# Patient Record
Sex: FEMALE | Race: WHITE | NOT HISPANIC OR LATINO | Employment: FULL TIME | URBAN - METROPOLITAN AREA
[De-identification: names, ages, dates, MRNs, and addresses within clinical notes are randomized per-mention and may not be internally consistent; named-entity substitution may affect disease eponyms.]

---

## 2024-04-15 ENCOUNTER — OFFICE VISIT (OUTPATIENT)
Dept: URGENT CARE | Facility: PHYSICIAN GROUP | Age: 34
End: 2024-04-15
Payer: COMMERCIAL

## 2024-04-15 VITALS
SYSTOLIC BLOOD PRESSURE: 124 MMHG | HEART RATE: 90 BPM | TEMPERATURE: 97.6 F | RESPIRATION RATE: 16 BRPM | OXYGEN SATURATION: 97 % | WEIGHT: 224 LBS | DIASTOLIC BLOOD PRESSURE: 74 MMHG | HEIGHT: 69 IN | BODY MASS INDEX: 33.18 KG/M2

## 2024-04-15 DIAGNOSIS — Z86.19 HISTORY OF CANDIDAL VULVOVAGINITIS: ICD-10-CM

## 2024-04-15 DIAGNOSIS — J02.0 STREP PHARYNGITIS: Primary | ICD-10-CM

## 2024-04-15 LAB
FLUAV RNA SPEC QL NAA+PROBE: NEGATIVE
FLUBV RNA SPEC QL NAA+PROBE: NEGATIVE
RSV RNA SPEC QL NAA+PROBE: NEGATIVE
S PYO DNA SPEC NAA+PROBE: DETECTED
SARS-COV-2 RNA RESP QL NAA+PROBE: NEGATIVE

## 2024-04-15 PROCEDURE — 3074F SYST BP LT 130 MM HG: CPT

## 2024-04-15 PROCEDURE — 3078F DIAST BP <80 MM HG: CPT

## 2024-04-15 PROCEDURE — 87651 STREP A DNA AMP PROBE: CPT

## 2024-04-15 PROCEDURE — 99203 OFFICE O/P NEW LOW 30 MIN: CPT

## 2024-04-15 PROCEDURE — 0241U POCT CEPHEID COV-2, FLU A/B, RSV - PCR: CPT

## 2024-04-15 RX ORDER — FLUCONAZOLE 150 MG/1
TABLET ORAL
Qty: 2 TABLET | Refills: 0 | Status: SHIPPED | OUTPATIENT
Start: 2024-04-15

## 2024-04-15 RX ORDER — AMOXICILLIN AND CLAVULANATE POTASSIUM 875; 125 MG/1; MG/1
1 TABLET, FILM COATED ORAL 2 TIMES DAILY
Qty: 20 TABLET | Refills: 0 | Status: SHIPPED | OUTPATIENT
Start: 2024-04-15 | End: 2024-04-25

## 2024-04-15 RX ORDER — DEXAMETHASONE SODIUM PHOSPHATE 10 MG/ML
10 INJECTION INTRAMUSCULAR; INTRAVENOUS ONCE
Status: COMPLETED | OUTPATIENT
Start: 2024-04-15 | End: 2024-04-15

## 2024-04-15 RX ADMIN — DEXAMETHASONE SODIUM PHOSPHATE 10 MG: 10 INJECTION INTRAMUSCULAR; INTRAVENOUS at 09:07

## 2024-04-15 ASSESSMENT — ENCOUNTER SYMPTOMS
DIZZINESS: 0
VOMITING: 0
SPUTUM PRODUCTION: 0
STRIDOR: 0
WHEEZING: 0
COUGH: 0
EYE REDNESS: 0
CHILLS: 0
FEVER: 0
PALPITATIONS: 0
SHORTNESS OF BREATH: 0
NAUSEA: 0
ABDOMINAL PAIN: 0
HEADACHES: 0
DIARRHEA: 0
MYALGIAS: 0
SORE THROAT: 1
EYE DISCHARGE: 0
EYE PAIN: 0

## 2024-04-15 NOTE — PROGRESS NOTES
Subjective:   Delphine Elias is a 33 y.o. female who presents for Sore Throat (Pt states she had strep about 2 weeks ago and symptoms went away and started having a sore throat x 1 day)          I introduced myself to the patient and informed them that I am a family nurse practitioner.    HPI:Delphine a 33-year-old female who comes in today c/o pharyngitis. Onset was yesterday.  Patient states she was diagnosed with strep throat about 2 weeks ago [place] was [place], was treated with amoxicillin,, symptoms lasted several days and then she felt better.  She states that her symptoms started up again yesterday with severe sore throat.  Patient describes symptoms as constant. They describe the pain as burning, sharp and scratchy. Aggravating factors include eating, drinking, swallowing. Relieving factors include none. Treatments tried at home include  Advil with poor result . They describe their symptoms as moderate. Denies any known exposure to Covid, Flu, RSV, strep. Denies anyone else is sick at home presently.        Review of Systems   Constitutional:  Negative for chills, fever and malaise/fatigue.   HENT:  Positive for sore throat. Negative for congestion and ear pain.    Eyes:  Negative for pain, discharge and redness.   Respiratory:  Negative for cough, sputum production, shortness of breath, wheezing and stridor.    Cardiovascular:  Negative for chest pain and palpitations.   Gastrointestinal:  Negative for abdominal pain, diarrhea, nausea and vomiting.   Genitourinary:  Negative for dysuria.   Musculoskeletal:  Negative for myalgias.   Skin:  Negative for rash.   Neurological:  Negative for dizziness and headaches.       Medications: azithromycin Tabs  fluconazole     Allergies: Patient has no known allergies.    Problem List: does not have a problem list on file.    Surgical History:  No past surgical history on file.    Past Social Hx:   reports that she has never smoked. She has never used smokeless  "tobacco. She reports that she does not drink alcohol and does not use drugs.     Past Family Hx:   family history is not on file.     Problem list, medications, and allergies reviewed by myself today in Epic.   I have documented what I find to be significant in regards to past medical, social, family and surgical history  in my HPI or under PMH/PSH/FH review section, otherwise it is noncontributory     Objective:     /74 (BP Location: Right arm, Patient Position: Sitting, BP Cuff Size: Adult long)   Pulse 90   Temp 36.4 °C (97.6 °F) (Temporal)   Resp 16   Ht 1.753 m (5' 9\")   Wt 102 kg (224 lb)   SpO2 97%   BMI 33.08 kg/m²     During this visit, appropriate PPE was worn, and hand hygiene was performed.    Physical Exam  Vitals reviewed.   Constitutional:       General: She is not in acute distress.     Appearance: Normal appearance. She is not ill-appearing or toxic-appearing.   HENT:      Head: Normocephalic and atraumatic.      Right Ear: Tympanic membrane, ear canal and external ear normal. There is no impacted cerumen.      Left Ear: Tympanic membrane, ear canal and external ear normal. There is no impacted cerumen.      Nose: No congestion or rhinorrhea.      Mouth/Throat:      Pharynx: Oropharynx is clear. Posterior oropharyngeal erythema present. No oropharyngeal exudate.      Comments: Tonsillar swelling 2+ bilaterally with erythema, no exudates. There is posterior oropharyngeal erythema present.  No soft tissue swelling of the sublingual mucosa, no petechia or swelling of the soft or hard palate, no unilarteral oropharynx swelling, no sign of tonsillar stone, epiglottitis, or abscess.  Airway is patent and there is no stridor.  Patient is managing oral secretions appropriately.  Uvula is midline and appropriate size with no erythema or edema.    Eyes:      General: No scleral icterus.        Right eye: No discharge.         Left eye: No discharge.      Conjunctiva/sclera: Conjunctivae normal. " "     Pupils: Pupils are equal, round, and reactive to light.   Cardiovascular:      Rate and Rhythm: Normal rate and regular rhythm.      Heart sounds: Normal heart sounds. No murmur heard.     No friction rub. No gallop.   Pulmonary:      Effort: Pulmonary effort is normal. No respiratory distress.      Breath sounds: Normal breath sounds. No wheezing, rhonchi or rales.   Abdominal:      General: There is no distension.   Musculoskeletal:         General: Normal range of motion.      Cervical back: Normal range of motion. No rigidity.      Right lower leg: No edema.      Left lower leg: No edema.   Lymphadenopathy:      Cervical: No cervical adenopathy.   Skin:     General: Skin is warm and dry.      Coloration: Skin is not jaundiced.   Neurological:      General: No focal deficit present.      Mental Status: She is alert and oriented to person, place, and time. Mental status is at baseline.   Psychiatric:         Mood and Affect: Mood normal.         Behavior: Behavior normal.         Thought Content: Thought content normal.         Judgment: Judgment normal.         Lab Results/POC Test Results   Results for orders placed or performed in visit on 04/15/24   POCT CEPHEID GROUP A STREP - PCR   Result Value Ref Range    POC Group A Strep, PCR Detected (A) Not Detected, Invalid   POCT CoV-2, Flu A/B, RSV by PCR   Result Value Ref Range    SARS-CoV-2 by PCR Negative Negative, Invalid    Influenza virus A RNA Negative Negative, Invalid    Influenza virus B, PCR Negative Negative, Invalid    RSV, PCR Negative Negative, Invalid          Per UTD for recurrent strep - \"we repeat a full 10-day course of therapy. We usually select an antibiotic that has greater beta-lactamase stability than the one used for the initial treatment course. As examples, if penicillin was used for initial treatment, we use either amoxicillin-clavulanate or a first-generation cephalosporin; if a first-generation cephalosporin was used, we select a " "later-generation cephalosporin. The rationale for this strategy is based on clinical data that suggest relapse rates may be lower with cephalosporin use, as well as scientific observations that antibiotics with greater beta-lactamase activity may be more effective in eradicating GAS from the oropharynx. (See 'Antibiotic treatment' above.)\"    Assessment/Plan:     Diagnosis and associated orders:     1. Strep pharyngitis  POCT CEPHEID GROUP A STREP - PCR    POCT CoV-2, Flu A/B, RSV by PCR    dexamethasone (Decadron) injection (check route below) 10 mg    amoxicillin-clavulanate (AUGMENTIN) 875-125 MG Tab      2. History of candidal vulvovaginitis  fluconazole (DIFLUCAN) 150 MG tablet         Comments/MDM:     1. Strep pharyngitis  . Strep PCR is positive.  I did notify the patient and discuss strep precautions-   -no sharing of drinks of water bottles, change toothbrush and pillowcases after 48 hours of antibiotics  - generally after 48 hours of antibiotics no longer infectious, may return to work.   -May use Flonase nasal spray to help with the congestion.    -I also advised them to use a humidifier in their room at night to help with sleep, and to gargle with salt water as needed to help soothe the sore throat.    I did offer patient a dose of Decadron in clinic today to help relieve inflamed throat tissue, instructed them regarding purpose, side effects, precautions including that this may make birth control less effective and she should use other precautions such a barrier methods for the next 28 days.  They state they understand, and want to go ahead with the dose.  I did keep them in clinic for 10 minutes after the dose, they tolerated well with no adverse reactions before discharge.    - I did print out written information regarding strep pharyngitis and all medications prescribed, and I did go over these with the patient.   -They state they understand all instructions and and are agreeable with the plan of " care.      - POCT CEPHEID GROUP A STREP - PCR  - POCT CoV-2, Flu A/B, RSV by PCR  - dexamethasone (Decadron) injection (check route below) 10 mg  - amoxicillin-clavulanate (AUGMENTIN) 875-125 MG Tab; Take 1 Tablet by mouth 2 times a day for 10 days.  Dispense: 20 Tablet; Refill: 0      - POCT CEPHEID GROUP A STREP - PCR  - POCT CoV-2, Flu A/B, RSV by PCR  - dexamethasone (Decadron) injection (check route below) 10 mg    2. History of candidal vulvovaginitis  Patient states she does tend to get vaginal yeast infections after every time she takes course of antibiotics and is asking for preemptive treatment with Diflucan.  I did send this into her pharmacy, instructed her to take it should she get any symptoms.  She states she understands and is agreeable.  - fluconazole (DIFLUCAN) 150 MG tablet; Take one tablet orally for yeast infection, if symptoms persist, may repeat treatment after 72 hours.  Dispense: 2 Tablet; Refill: 0         Pt is clinically stable at today's acute urgent care visit. Vital signs are normal and reassuring.  No acute distress noted. Appropriate for outpatient management at this time.        I personally reviewed prior external notes and test results pertinent to today's visit.  I have independently reviewed and interpreted all diagnostics ordered during this urgent care acute visit.        Please note that this dictation was created using voice recognition software. I have made a reasonable attempt to correct obvious errors, but I expect that there are errors of grammar and possibly content that I did not discover before finalizing the note.    This note was electronically signed by Faheem GARCIA, MANAS, GUERO, RACHEL

## 2024-12-24 ENCOUNTER — OFFICE VISIT (OUTPATIENT)
Dept: URGENT CARE | Facility: PHYSICIAN GROUP | Age: 34
End: 2024-12-24
Payer: COMMERCIAL

## 2024-12-24 VITALS
DIASTOLIC BLOOD PRESSURE: 76 MMHG | HEIGHT: 69 IN | SYSTOLIC BLOOD PRESSURE: 118 MMHG | RESPIRATION RATE: 22 BRPM | TEMPERATURE: 99.7 F | BODY MASS INDEX: 31.1 KG/M2 | OXYGEN SATURATION: 96 % | HEART RATE: 91 BPM | WEIGHT: 210 LBS

## 2024-12-24 DIAGNOSIS — L03.115 CELLULITIS OF RIGHT LOWER EXTREMITY: ICD-10-CM

## 2024-12-24 DIAGNOSIS — R50.9 FEVER, UNSPECIFIED FEVER CAUSE: ICD-10-CM

## 2024-12-24 PROCEDURE — 3074F SYST BP LT 130 MM HG: CPT | Performed by: NURSE PRACTITIONER

## 2024-12-24 PROCEDURE — 99213 OFFICE O/P EST LOW 20 MIN: CPT | Performed by: NURSE PRACTITIONER

## 2024-12-24 PROCEDURE — 3078F DIAST BP <80 MM HG: CPT | Performed by: NURSE PRACTITIONER

## 2024-12-24 RX ORDER — ACETAMINOPHEN 500 MG
1000 TABLET ORAL ONCE
Status: COMPLETED | OUTPATIENT
Start: 2024-12-24 | End: 2024-12-24

## 2024-12-24 RX ORDER — CLINDAMYCIN HYDROCHLORIDE 300 MG/1
300 CAPSULE ORAL 3 TIMES DAILY
Qty: 15 CAPSULE | Refills: 0 | Status: SHIPPED | OUTPATIENT
Start: 2024-12-24 | End: 2024-12-29

## 2024-12-24 RX ADMIN — Medication 1000 MG: at 11:43

## 2024-12-24 ASSESSMENT — ENCOUNTER SYMPTOMS
FEVER: 0
MYALGIAS: 0
CHILLS: 1
GASTROINTESTINAL NEGATIVE: 1

## 2024-12-24 NOTE — PROGRESS NOTES
"Subjective     Delphine Elias is a 34 y.o. female who presents with Cyst (Inner R thigh cyst near groin popped yesterday. Feels warm, pt has chills, is concerned about infection.)            Cyst  Associated symptoms include chills. Pertinent negatives include no fever or myalgias.   Delphine has come into urgent care today as she has been experiencing cyst on her right inner thigh x 2 days.  States that area has increased in redness and firmness of skin.  Experiencing mild chills.  States cyst did \"pop open\" and had minimal drainage.    PMH:  has no past medical history on file.  MEDS:   Current Outpatient Medications:     clindamycin (CLEOCIN) 300 MG Cap, Take 1 Capsule by mouth 3 times a day for 5 days., Disp: 15 Capsule, Rfl: 0    fluconazole (DIFLUCAN) 150 MG tablet, Take one tablet orally for yeast infection, if symptoms persist, may repeat treatment after 72 hours. (Patient not taking: Reported on 12/24/2024), Disp: 2 Tablet, Rfl: 0  ALLERGIES: No Known Allergies  SURGHX: History reviewed. No pertinent surgical history.  SOCHX:  reports that she has never smoked. She has never used smokeless tobacco. She reports that she does not drink alcohol and does not use drugs.  FH: Family history was reviewed, no pertinent findings to report      Review of Systems   Constitutional:  Positive for chills. Negative for fever and malaise/fatigue.   Gastrointestinal: Negative.    Musculoskeletal:  Negative for myalgias.   Skin:         Red painful cyst to right inner thigh.   All other systems reviewed and are negative.             Objective     /76 (BP Location: Left arm, Patient Position: Sitting, BP Cuff Size: Adult)   Pulse 91   Temp 37.6 °C (99.7 °F) (Temporal)   Resp (!) 22   Ht 1.753 m (5' 9\")   Wt 95.3 kg (210 lb)   SpO2 96%   BMI 31.01 kg/m²      Physical Exam  Vitals reviewed.   Constitutional:       General: She is awake. She is not in acute distress.     Appearance: Normal appearance. She is not " ill-appearing, toxic-appearing or diaphoretic.   Cardiovascular:      Rate and Rhythm: Normal rate.   Pulmonary:      Effort: Pulmonary effort is normal.   Skin:     General: Skin is warm and dry.      Findings: Erythema present. No abrasion, bruising, signs of injury, laceration, rash or wound.             Comments: Large erythematous area of induration at right upper thigh.  No fluctuation, open wound but does have a rodrigues.  Increased warmth to skin, tenderness on palpation of site.   Neurological:      Mental Status: She is alert and oriented to person, place, and time.   Psychiatric:         Attention and Perception: Attention normal.         Mood and Affect: Mood normal.         Speech: Speech normal.         Behavior: Behavior normal. Behavior is cooperative.                             Assessment & Plan        Assessment & Plan  Cellulitis of right lower extremity    Orders:    clindamycin (CLEOCIN) 300 MG Cap; Take 1 Capsule by mouth 3 times a day for 5 days.    Fever, unspecified fever cause    Orders:    acetaminophen (Tylenol) tablet 1,000 mg    -Do not pick at or open lesion  -Recommend warm compress to site to help expel additional exudate  -May use cool compress at site as needed for discomfort and decrease swelling  -May use over the counter ibuprofen/Tylenol as needed for discomfort  -Continue antibiotic until finished unless otherwise indicated  -Keep area clean, wash around site and any drainage around site with mild soap and water  -Monitor for increase in discharge, redness, swelling or size of cyst site with fluctuance- need re-evaluation in urgent care  -Recheck as needed, but if symptoms persist or worsen with fever-May need to go to emergency room for IV antibiotics, patient understands this

## 2025-05-31 ENCOUNTER — OFFICE VISIT (OUTPATIENT)
Dept: URGENT CARE | Facility: PHYSICIAN GROUP | Age: 35
End: 2025-05-31
Payer: COMMERCIAL

## 2025-05-31 VITALS
TEMPERATURE: 98.8 F | DIASTOLIC BLOOD PRESSURE: 80 MMHG | HEART RATE: 66 BPM | HEIGHT: 69 IN | OXYGEN SATURATION: 100 % | RESPIRATION RATE: 16 BRPM | BODY MASS INDEX: 27.13 KG/M2 | SYSTOLIC BLOOD PRESSURE: 120 MMHG | WEIGHT: 183.2 LBS

## 2025-05-31 DIAGNOSIS — L73.9 INFLAMED HAIR FOLLICLE: Primary | ICD-10-CM

## 2025-05-31 DIAGNOSIS — L01.00 IMPETIGO: ICD-10-CM

## 2025-05-31 RX ORDER — CLINDAMYCIN HYDROCHLORIDE 300 MG/1
300 CAPSULE ORAL 3 TIMES DAILY
Qty: 21 CAPSULE | Refills: 0 | Status: SHIPPED | OUTPATIENT
Start: 2025-05-31 | End: 2025-06-07

## 2025-05-31 RX ORDER — MUPIROCIN 20 MG/G
1 OINTMENT TOPICAL 2 TIMES DAILY
Qty: 15 G | Refills: 0 | Status: SHIPPED | OUTPATIENT
Start: 2025-05-31 | End: 2025-06-10

## 2025-05-31 NOTE — PROGRESS NOTES
"Subjective:   Delphine Elias is a 34 y.o. female who presents for Oral Swelling (Numbing, tingling on the (L) side of of the lip, ingrown hair in the (L) nostril, when touching and pressing on the (L) side of lip pt experiences pain X felt slight pain in the (L) nostril yesterday, but woke up this morning with swelling )      HPI:  34-year-old female presents to the urgent care for possible inflamed hair follicle/infection to her left nostril.  States that she noticed inflamed red bump within the left nostril that is tender.  She has not noticed a rodrigues to the area.  She has not had any discharge from the area.  Today she started noticing the swelling increasing from that area and traveling to the top part of her left lip.  Does have a slight numb sensation to the area.  No fever, headache, dizziness.    Medications:    clindamycin Caps  fluconazole  mupirocin Oint    Allergies: Patient has no known allergies.    Problem List: Delphine Elias does not have a problem list on file.    Surgical History:  No past surgical history on file.    Past Social Hx: Delphine Elias  reports that she has never smoked. She has never used smokeless tobacco. She reports that she does not drink alcohol and does not use drugs.     Past Family Hx:  Delphine Elias family history is not on file.     Problem list, medications, and allergies reviewed by myself today in Epic.     Objective:     /80 (BP Location: Right arm, Patient Position: Sitting, BP Cuff Size: Adult)   Pulse 66   Temp 37.1 °C (98.8 °F) (Temporal)   Resp 16   Ht 1.753 m (5' 9\")   Wt 83.1 kg (183 lb 3.2 oz)   SpO2 100%   BMI 27.05 kg/m²     Physical Exam  Vitals reviewed.   Constitutional:       Appearance: She is not toxic-appearing.   HENT:      Nose: Nose normal. No congestion or rhinorrhea.        Comments: 10 mm circumferential erythematous swelling within the left nostril.  Honey crusted lesion present.  No rodrigues present.  No " discharge.  No fluctuance.  Soft tissue swelling extending to the left upper lip.  No erythema or fluctuance extending down to the lip.  Eyes:      Conjunctiva/sclera: Conjunctivae normal.   Pulmonary:      Effort: Pulmonary effort is normal.   Neurological:      General: No focal deficit present.         Assessment/Plan:     Diagnosis and associated orders:     1. Inflamed hair follicle        2. Impetigo  mupirocin (BACTROBAN) 2 % Ointment    clindamycin (CLEOCIN) 300 MG Cap         Comments/MDM:     Patient's presentation physical exam findings are consistent with possible inflamed hair follicle versus impetigo given the honey crusted lesion.  Given the location, she may be able to treat this with Bactroban.  However, given that it may be difficult to get to the posterior aspect of this swelling, I did prescribe clindamycin as well.  Discussed it will take approximately 72 hours to notice any true improvement.  If symptoms do worsen at that time or begin to severely worsen prior to that she should be reevaluated.         Differential diagnosis, natural history, supportive care, and indications for immediate follow-up discussed.    Advised the patient to follow-up with the primary care physician for recheck, reevaluation, and consideration of further management.    Please note that this dictation was created using voice recognition software. I have made a reasonable attempt to correct obvious errors, but I expect that there are errors of grammar and possibly content that I did not discover before finalizing the note.    Electronically signed by Faheem Mccarty PA-C.